# Patient Record
Sex: FEMALE | Race: WHITE | NOT HISPANIC OR LATINO | Employment: FULL TIME | URBAN - METROPOLITAN AREA
[De-identification: names, ages, dates, MRNs, and addresses within clinical notes are randomized per-mention and may not be internally consistent; named-entity substitution may affect disease eponyms.]

---

## 2023-11-18 ENCOUNTER — OFFICE VISIT (OUTPATIENT)
Dept: URGENT CARE | Facility: CLINIC | Age: 23
End: 2023-11-18
Payer: COMMERCIAL

## 2023-11-18 VITALS
OXYGEN SATURATION: 97 % | WEIGHT: 120 LBS | RESPIRATION RATE: 18 BRPM | HEART RATE: 116 BPM | TEMPERATURE: 98.1 F | DIASTOLIC BLOOD PRESSURE: 89 MMHG | SYSTOLIC BLOOD PRESSURE: 139 MMHG

## 2023-11-18 DIAGNOSIS — J06.9 URI WITH COUGH AND CONGESTION: Primary | ICD-10-CM

## 2023-11-18 PROCEDURE — 99213 OFFICE O/P EST LOW 20 MIN: CPT

## 2023-11-18 RX ORDER — MULTIVITAMIN
1 CAPSULE ORAL DAILY
COMMUNITY

## 2023-11-18 NOTE — PATIENT INSTRUCTIONS
At this time you have been diagnosed with a viral Upper Respiratory Infection (URI). Antibiotics are not indicated for viral infections. Sometimes an upper respiratory infection may lead to secondary bacterial infection, in which case antibiotics would be indicated at that time. That is not currently the case. For viral illnesses, the recommendation is for supportive care which would consists of the following: For decongestion, Over The Counter medications:  2nd Generation antihistamines with a decongestant such as: Claritin-D (Loratadine with Pseudoephedrine), Zyrtec-D (Cetirizine with Pseudoephedrine), or Allegra-D (Fexofenadine with Pseudoephedrine)  Nasal corticosteroid: examples are Flonase or Nasacort. For Cough or sore throat:  Salt water gurgle  Honey  Chloraseptic spray  Throat lozenges  Over the Counter Tylenol or Ibuprofen  Follow up with PCP in 3-5 days if symptoms not improving. Proceed to ER if symptoms worsen.

## 2023-11-18 NOTE — PROGRESS NOTES
[facility]        NAME: Yasmeen Lamb is a 20 y.o. female  : 2000    MRN: [id number]  DATE: 2023  TIME: 2:37 PM    Assessment and Plan   URI with cough and congestion [J06.9]  1. URI with cough and congestion              Patient Instructions   At this time you have been diagnosed with a viral Upper Respiratory Infection (URI). Antibiotics are not indicated for viral infections. Sometimes an upper respiratory infection may lead to secondary bacterial infection, in which case antibiotics would be indicated at that time. That is not currently the case. For viral illnesses, the recommendation is for supportive care which would consists of the following: For decongestion, Over The Counter medications:  2nd Generation antihistamines with a decongestant such as: Claritin-D (Loratadine with Pseudoephedrine), Zyrtec-D (Cetirizine with Pseudoephedrine), or Allegra-D (Fexofenadine with Pseudoephedrine)  Nasal corticosteroid: examples are Flonase or Nasacort. For Cough or sore throat:  Salt water gurgle  Honey  Chloraseptic spray  Throat lozenges  Over the Counter Tylenol or Ibuprofen  Follow up with PCP in 3-5 days if symptoms not improving. Proceed to ER if symptoms worsen. Chief Complaint     Chief Complaint   Patient presents with   • Cough     Pt states she has had a mucusy cough for a week and a half. Was seen on telemed when it started and was told it would improve. Cough has slightly gotten better but is still persistent. No fever. History of Present Illness       Cough for about 10 days. Was around boyfriend's brother who currently has pneumonia 10 days ago and so concerned that she might have it too. No SOB, no SOB, no fever. Review of Systems   Review of Systems   Constitutional:  Negative for chills and fever. HENT:  Positive for congestion and sore throat. Negative for ear pain. Eyes:  Negative for pain and visual disturbance.    Respiratory: Positive for cough. Negative for shortness of breath. Cardiovascular:  Negative for chest pain and palpitations. Gastrointestinal:  Negative for abdominal pain and vomiting. Genitourinary:  Negative for dysuria and hematuria. Musculoskeletal:  Negative for arthralgias and back pain. Skin:  Negative for color change and rash. Neurological:  Negative for dizziness, seizures, syncope, weakness and numbness. All other systems reviewed and are negative. Current Medications       Current Outpatient Medications:   •  inFLIXimab-dyyb (INFLECTRA IV), Inject into a catheter in a vein every 8 weeks, Disp: , Rfl:   •  Multiple Vitamin (multivitamin) capsule, Take 1 capsule by mouth daily, Disp: , Rfl:     Current Allergies     Allergies as of 11/18/2023 - Reviewed 11/18/2023   Allergen Reaction Noted   • Lialda [mesalamine] GI Intolerance 11/18/2023   • Triple antibiotic w/hydrocortisone [bacitra-neomycin-polymyxin-hc] Rash 11/18/2023            The following portions of the patient's history were reviewed and updated as appropriate: allergies, current medications, past family history, past medical history, past social history, past surgical history and problem list.     History reviewed. No pertinent past medical history. History reviewed. No pertinent surgical history. History reviewed. No pertinent family history. Medications have been verified. Objective   /89   Pulse (!) 116   Temp 98.1 °F (36.7 °C)   Resp 18   Wt 54.4 kg (120 lb)   SpO2 97%   No LMP recorded. Physical Exam     Physical Exam  Vitals and nursing note reviewed. Constitutional:       Appearance: Normal appearance. HENT:      Head: Normocephalic and atraumatic. Right Ear: Tympanic membrane normal.      Left Ear: Tympanic membrane normal.      Nose: Congestion present. Mouth/Throat:      Pharynx: Posterior oropharyngeal erythema present. No oropharyngeal exudate.    Eyes:      Pupils: Pupils are equal, round, and reactive to light. Cardiovascular:      Pulses: Normal pulses. Heart sounds: Normal heart sounds. Pulmonary:      Effort: Pulmonary effort is normal.      Breath sounds: Normal breath sounds. Skin:     General: Skin is warm and dry. Capillary Refill: Capillary refill takes less than 2 seconds. Neurological:      General: No focal deficit present. Mental Status: She is alert and oriented to person, place, and time. Mental status is at baseline. Sensory: No sensory deficit. Motor: No weakness. Psychiatric:         Mood and Affect: Mood normal.         Behavior: Behavior normal.         Thought Content:  Thought content normal.